# Patient Record
Sex: FEMALE | Race: BLACK OR AFRICAN AMERICAN | Employment: FULL TIME | URBAN - METROPOLITAN AREA
[De-identification: names, ages, dates, MRNs, and addresses within clinical notes are randomized per-mention and may not be internally consistent; named-entity substitution may affect disease eponyms.]

---

## 2017-06-12 ENCOUNTER — TELEPHONE (OUTPATIENT)
Dept: FAMILY MEDICINE CLINIC | Facility: CLINIC | Age: 27
End: 2017-06-12

## 2017-06-12 NOTE — TELEPHONE ENCOUNTER
Request received from Ant Ferrari for medication list, signed release included.  Medication list faxed to [phone]        Facility / Provider [facility], [address]  [address], [place], [address] [place] [address]  Facility Phone:

## 2017-06-12 NOTE — TELEPHONE ENCOUNTER
Spoke with Pallavi Marion, discussed need for appointment to prescribe medication. Pallavi Marion will encourage pt to schedule with Dr Michelle Whitman.     Left message for pt to call the office to schedule appointment    Pt called back, appointment scheduled    Future Appointment

## 2017-06-13 NOTE — PATIENT INSTRUCTIONS
· Taking antidepressants can put you at risk of increased suicidal thoughts or depression  · If use feel suicidal or homicidal, call 911 or go to the nearest ER or call our office. dont stop Paroxetine without calling due to side effects.   Depression  Dep · Take care of your physical body. Eat a balanced diet (low in saturated fat and high in fruits and vegetables). Exercise at least 3 times a week for 30 minutes. Even mild-moderate exercise (like brisk walking) can make you feel better.   · Avoid alcohol, w © 1413-1875 [address], [address]. All rights reserved. This information is not intended as a substitute for professional medical care. Always follow your healthcare professional's instructions.         Erwin Mane · Generalized anxiety disorder: This causes constant worry that can greatly disrupt your life. Getting better  You may believe that nothing can help you. Or, you might fear what others may think. But most anxiety symptoms can be eased.  Having an anxiety · Cognitive therapy helps you identify the negative, irrational thoughts that occur with your anxiety. You’ll learn to replace these with more positive, realistic thoughts. · Behavioral therapy helps you change how you react to anxiety.  You’ll learn copin © 0108-8618 The [address] [place] [address], [address]. All rights reserved. This information is not intended as a substitute for professional medical care. Always follow your healthcare professional's instructions.         Depress · Eat right. A balanced and healthy diet helps keep your body healthy. Date Last Reviewed: 1/19/2015  © 4693-3169 [address], [address] [place] [address]. All rights reserved.  This information is not intended as a substitute f If you notice any of these warning signs, call for help right away. You can call a mental health clinic or a 24-hour suicide crisis hotline for help and support. Look for the number in the white pages of your phone book under \"Suicide. \" In an emergency,

## 2017-06-14 NOTE — PROGRESS NOTES
CHIEF COMPLAINT: Patient presents with: Anxiety: counselor suggest anxiety treatment    HPI:     Deanne Cole is a 32year old female presents for discuss panic attacks and depression. Seen a counselor weekly.   Counselor instructed patient to discuss med EXAM:   /62 mmHg  Pulse 67  Temp(Src) 98.4 °F (36.9 °C) (Oral)  Resp 20  Wt 112 lb 6.4 oz  SpO2 99%  LMP 05/26/2017 (Approximate)  Vital signs reviewed. Appears stated age, well groomed. Physical Exam  General: Well-nourished, well hydrated.  No acute tablet 1      Sig: Take 1 tablet (25 mg total) by mouth every morning.            Health Maintenance:  Annual Physical due on 02/10/1992  Pap Smear,2 Years due on 02/10/2011  Influenza Vaccine(Season Ended) due on 09/01/2017      Patient/Caregiver Education

## 2017-07-26 ENCOUNTER — TELEPHONE (OUTPATIENT)
Dept: FAMILY MEDICINE CLINIC | Facility: CLINIC | Age: 27
End: 2017-07-26

## 2017-07-26 NOTE — TELEPHONE ENCOUNTER
Received paperwork requesting peer review regarding pt disability.     Returned call to Dr Juan Manuel Naik, left message for MD to return call

## 2017-07-31 NOTE — TELEPHONE ENCOUNTER
Spoke with Dr Norberto Hdez, he is reviewing patient's disability. He is interested in her psychiatric status from February to April 2017. RN informed Dr Norberto Hdez that the patient was not seen in this office during that time frame.  No further action needed

## 2019-02-14 ENCOUNTER — PATIENT OUTREACH (OUTPATIENT)
Dept: FAMILY MEDICINE CLINIC | Facility: CLINIC | Age: 29
End: 2019-02-14

## 2019-03-09 NOTE — PROGRESS NOTES
Sent patient letter to schedule a physical, letter came back return to sender attempted not known unable to forward

## (undated) NOTE — LETTER
February 14, 2019        [address] [address]      Dear Annelise Grande: We have attempted to contact you by phone with no success.  In an effort to provide quality patient care we are reaching out to you to contact the office at

## (undated) NOTE — MR AVS SNAPSHOT
[facility] [place]  [address] [facility] [address]  [phone]               Thank you for choosing us for your health care visit with Amandeep Gan DO. We are glad to serve you and happy to provide you with this summary of your visit.   Pl The treatment for depression may include both medicine and psychotherapy. Antidepressants can reduce suffering and can improve the ability to function during the depressed period.  Therapy can offer emotional support and help you understand emotional factor Call your healthcare provider right away if any of these occur:  · Feeling extreme depression, fear, anxiety, or anger toward yourself or others  · Feeling out of control  · Feeling that you may try to harm yourself or another  · Hearing voices that others and poor coping skills. Common anxiety disorders  · Panic disorder: This causes an intense fear of being in danger. · Phobias: These are extreme fears of certain objects, places, or events. · Obsessive-compulsive disorder:  This causes you to have unwant Research has shown CBT to be a very effective treatment for anxiety disorders. How CBT is run is almost like a class. It involves homework and activities to build skills that teach you to cope with anxiety step by step.  It can be done in a group or one-on- · Exercise — it’s a great way to relieve tension and help your body feel relaxed. · Examine your life for stress, and try to find ways to reduce it. · Avoid caffeine and nicotine, which can make anxiety symptoms worse.   · Fight the temptation to turn to · Exercise. It’s a great way to take care of your body. And studies have shown that exercise helps fight depression. · Avoid drugs and alcohol. These may ease the pain in the short term. But they’ll only make your problems worse in the long run.   · Get re · Participating in risky behaviors, such as sex with someone you don't know or drinking and driving  If you notice any of these warning signs, call for help right away.  You can call a mental health clinic or a 24-hour suicide crisis hotline for help and orellana Visit [facility]  You can access your MyChart to more actively manage your health care and view more details from this visit by going to [email or web address].   If you've recently had a stay at the Hospital you can access your discharge instructions i